# Patient Record
Sex: FEMALE | Race: BLACK OR AFRICAN AMERICAN | NOT HISPANIC OR LATINO | Employment: FULL TIME | URBAN - METROPOLITAN AREA
[De-identification: names, ages, dates, MRNs, and addresses within clinical notes are randomized per-mention and may not be internally consistent; named-entity substitution may affect disease eponyms.]

---

## 2017-10-09 ENCOUNTER — INITIAL CONSULT (OUTPATIENT)
Dept: UROGYNECOLOGY | Facility: CLINIC | Age: 57
End: 2017-10-09
Payer: COMMERCIAL

## 2017-10-09 VITALS
SYSTOLIC BLOOD PRESSURE: 159 MMHG | BODY MASS INDEX: 26.61 KG/M2 | WEIGHT: 155.88 LBS | DIASTOLIC BLOOD PRESSURE: 96 MMHG | HEIGHT: 64 IN | TEMPERATURE: 98 F | HEART RATE: 81 BPM

## 2017-10-09 DIAGNOSIS — N39.8 VOIDING DYSFUNCTION: ICD-10-CM

## 2017-10-09 DIAGNOSIS — R39.89 CYSTALGIA: ICD-10-CM

## 2017-10-09 DIAGNOSIS — N31.8 FREQUENCY-URGENCY SYNDROME: Primary | ICD-10-CM

## 2017-10-09 DIAGNOSIS — R35.1 NOCTURIA: ICD-10-CM

## 2017-10-09 PROCEDURE — 99999 PR PBB SHADOW E&M-NEW PATIENT-LVL III: CPT | Mod: PBBFAC,,, | Performed by: OBSTETRICS & GYNECOLOGY

## 2017-10-09 PROCEDURE — 99204 OFFICE O/P NEW MOD 45 MIN: CPT | Mod: S$GLB,,, | Performed by: OBSTETRICS & GYNECOLOGY

## 2017-10-09 PROCEDURE — 81002 URINALYSIS NONAUTO W/O SCOPE: CPT | Mod: S$GLB,,, | Performed by: OBSTETRICS & GYNECOLOGY

## 2017-10-09 RX ORDER — RAMIPRIL 5 MG/1
5 CAPSULE ORAL DAILY
Refills: 0 | COMMUNITY
Start: 2017-08-08

## 2017-10-09 RX ORDER — TAMSULOSIN HYDROCHLORIDE 0.4 MG/1
0.4 CAPSULE ORAL DAILY
Qty: 30 CAPSULE | Refills: 11 | Status: SHIPPED | OUTPATIENT
Start: 2017-10-09 | End: 2018-10-09

## 2017-10-09 RX ORDER — FLAVOXATE HYDROCHLORIDE 100 MG/1
100 TABLET ORAL 2 TIMES DAILY
Qty: 60 TABLET | Refills: 11 | Status: SHIPPED | OUTPATIENT
Start: 2017-10-09

## 2017-10-09 NOTE — LETTER
October 9, 2017      [facility]  [address], [address]  [facility] [address]  Phone: [phone]  Fax: [phone]       Patient: Michael Rosado   YOB: 1960  Date of Visit: 10/09/2017    To Whom It May Concern:     Mrs Michael Rosado  was at [facility] on 10/09/2017. She may return to work on 10/10/2017 with no restrictions. If you have any questions or concerns, or if I can be of further assistance, please do not hesitate to contact me.    Sincerely,    Dr LAINEY Ivy/ [place]

## 2017-10-09 NOTE — PROGRESS NOTES
Subjective:     Chief Complaint:  urgency and nocturia  History of Present Illness: Michael Rosado is a 56 y.o. female who presents for frequency, urgency and nocturia.  His been going on for a long time in about 3 years ago she was told she might have a UTI and was given antibiotics with no response.  She was later  told she might have overactive bladder and was given Vesicare.  Also did not give her any improvement.  She has nocturia at least 3 times per night and sometimes more but typically reaches the bathroom without leakage.  She gets strong urge during the day but also typically reaches the bathroom, without urge incontinence.  she has postvoid fullness.  She also does not have stress incontinence.  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  She is a menopausal  2.  She notices that sometimes ibuprofen helps relieve her bladder symptoms.  She has no hematuria on urinalysis /has never seen any hematuria.  She denies frequent urinary tract infections and any history of kidney stones.  She is not on any hormone replacement.  All her history indicates a fairly healthy patient    Review of Systems    Constitutional: No acute distress. No weight gain/loss.  Eyes: No vision changes.  ENT: No headaches.   Respiratory: Nonsmoker  Cardiovascular: No chest pain. No edema.   Gastrointestinal: No constipation. No diarrhea. No fecal incontinence.   Genitourinary: No vaginal bleeding or discharge.  Integument/Breast: Negative  Hematologic/Lymphatic: No history of anemia.  Musculoskeletal: No major back pain. No abdominal pain.  Neurological: No known disc problems. No paresthesias.  Behavioral/Psych: No history of depression.   Endocrine: No hormonal replacement.  Allergy/Immune: no recent reactions     Objective:   General Exam:  General appearance: WDNF. NAD.   HEENT: Elo. EOM's intact.  Neck: Normal thyroid.   Back: No CVA tenderness.  RESP: No SOB.  Breasts:  deferred  Abdomen: Benign without localizing signs.  Extremities: No edema. No varices.  Lymphatic: noncontributory  Skin: No rashes. No lesions.  Neurologic: Intact.   Psych: Oriented.   Pelvic Exam:  V:  No lesions. No palpable nodes.   Va:No d/c bleeding or lesions.  Good length and support   .Meatus:No caruncle or stenosis  Urethra: Non tender. No suburethral masses.  No hypermobility  Cx/Cuff: Normal   Uterus:  nontender  Ad: No mass or tenderness.  Levators :Symmetrical.  Increased tone and increased bulk. Non tender.4/5 contractions  BL:  tender to palpation  RV: No hemorrhoids.  Assessment:   Frequency and urgency with significant bladder tenderness.  She did not respond to Vesicare.  Does not appear to be typical overactive bladder.  The pelvic floor tone and contractions are probably from chronic holding efforts.  Cannot rule out early interstitial cystitis     Plan:      Flomax and flavoxate-is not respond we will try myrbetreq, I suspect she may need cystoscopy to further evaluate

## 2017-10-11 LAB
BILIRUB SERPL-MCNC: ABNORMAL MG/DL
BLOOD URINE, POC: ABNORMAL
COLOR, POC UA: YELLOW
GLUCOSE UR QL STRIP: ABNORMAL
KETONES UR QL STRIP: ABNORMAL
LEUKOCYTE ESTERASE URINE, POC: ABNORMAL
NITRITE, POC UA: ABNORMAL
PH, POC UA: 7
PROTEIN, POC: ABNORMAL
SPECIFIC GRAVITY, POC UA: 1
UROBILINOGEN, POC UA: ABNORMAL

## 2017-11-06 ENCOUNTER — TELEPHONE (OUTPATIENT)
Dept: UROGYNECOLOGY | Facility: CLINIC | Age: 57
End: 2017-11-06

## 2017-11-06 NOTE — TELEPHONE ENCOUNTER
----- Message from Camelia Taylor sent at 11/6/2017  9:01 AM CST -----  Contact: Patient  Patient is trying to get in to see the doctor sooner than her appointment on 11/20.  Call Back#[phone] ('s cell, Semaj, patient is a  and in class all day) & [phone] (Patient's cell, please call after 3:30)  Thanks

## 2017-11-06 NOTE — TELEPHONE ENCOUNTER
----- Message from Jaquelin Gonzalez sent at 11/6/2017 12:22 PM CST -----  Contact: self  Transferred call to POD

## 2017-11-06 NOTE — TELEPHONE ENCOUNTER
Spoke with patient who states she no longer feel she needs an sooner appt. States she is feeling much better since this a.m will keep 11/20/17 appt

## 2017-11-06 NOTE — TELEPHONE ENCOUNTER
Spoke with patient  who states she have been having c/o same issues from when she was initally seen. Requested to be seen by Dr Biju powers that 11/20. Offered 11/7/17 appt. Pt  states she is a  and he will send her a text at 12:30 p.m to confirm appt. Awaiting pt call back

## 2017-11-20 ENCOUNTER — OFFICE VISIT (OUTPATIENT)
Dept: UROGYNECOLOGY | Facility: CLINIC | Age: 57
End: 2017-11-20
Payer: COMMERCIAL

## 2017-11-20 VITALS
DIASTOLIC BLOOD PRESSURE: 88 MMHG | HEIGHT: 64 IN | BODY MASS INDEX: 27.28 KG/M2 | WEIGHT: 159.81 LBS | SYSTOLIC BLOOD PRESSURE: 147 MMHG | TEMPERATURE: 98 F | HEART RATE: 73 BPM

## 2017-11-20 DIAGNOSIS — M62.89 PELVIC FLOOR DYSFUNCTION: ICD-10-CM

## 2017-11-20 DIAGNOSIS — R35.0 FREQUENCY OF MICTURITION: ICD-10-CM

## 2017-11-20 DIAGNOSIS — N32.81 OAB (OVERACTIVE BLADDER): Primary | ICD-10-CM

## 2017-11-20 DIAGNOSIS — R39.89 CYSTALGIA: ICD-10-CM

## 2017-11-20 DIAGNOSIS — R35.1 NOCTURIA: ICD-10-CM

## 2017-11-20 PROCEDURE — 99999 PR PBB SHADOW E&M-EST. PATIENT-LVL III: CPT | Mod: PBBFAC,,, | Performed by: OBSTETRICS & GYNECOLOGY

## 2017-11-20 PROCEDURE — 99213 OFFICE O/P EST LOW 20 MIN: CPT | Mod: S$GLB,,, | Performed by: OBSTETRICS & GYNECOLOGY

## 2017-11-20 PROCEDURE — 81002 URINALYSIS NONAUTO W/O SCOPE: CPT | Mod: S$GLB,,, | Performed by: OBSTETRICS & GYNECOLOGY

## 2017-11-20 NOTE — PROGRESS NOTES
"Subjective:     Chief Complaint: urgency  History of Present Illness: Michael Rosado is a 56 y.o. female who presents for 3 yr history of urgency.  She rarely has incontinence.  Has nocturia.  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  She has discontinued carbonated drinks but still drinks 1-2 cups of coffee in the morning.  Asked if weight gain could be a contributing factor.  She was started on Flomax and flavoxate and has noticed  significant but not complete response.  She has sinus problems and has some "dryness" when she agrees and question whether this could be related to the sinuses are the medications.    Review of Systems    Constitutional: No acute distress. No weight gain/loss.  Eyes: No vision changes.  ENT: No headaches.   Respiratory: "dryness" when she breathes  Cardiovascular:Hypertension  Gastrointestinal: No constipation. No diarrhea. No fecal incontinence.   Genitourinary: Menopausal  Integument/Breast: Negative  Hematologic/Lymphatic: No history of anemia.  Musculoskeletal: No back pain. No abdominal pain.  Neurological: No disc problems. No paresthesias.  Behavioral/Psych: No history of depression.   Endocrine: No hormonal replacement.  Allergy/Immune: no recent reactions     Objective:   General Exam:  General appearance: WDNF. NAD.   HEENT: Elo. EOM's intact.  Neck: Normal thyroid.   Back: No CVA tenderness.  RESP: No SOB.  Breasts: deferred  Abdomen: Benign without localizing signs.  Extremities: No edema. No varices.  Lymphatic: noncontributory  Skin: No rashes. No lesions.  Neurologic: Intact.   Psych: Oriented.   Pelvic Exam:  V:  No lesions. No palpable nodes.   Va:No d/c bleeding or lesions.   .Meatus:No caruncle or stenosis  Urethra: Non tender. No suburethral masses.  Cx/Cuff: Normal   Uterus: no abnormality  Ad: No mass or tenderness.  Levators :Symmetrical.  Increased tone and bulk  BL:  tender less than on previous exam.  She describes " previous exam as painful but this one is just more of a mild discomfort  RV: No hemorrhoids.  Assessment:   Improvement in symptoms but still some pelvic floor hypertonicity and bladder tenderness  .Rule out interstitial cystitis at this point  Discussed the role of her coffee intake and what she can do to lessen those effects       Plan:        Continue Flomax and increase flavoxate to 3 times a day then return for potassium permeability test.  If it is positive we should start her on pentosan.  If it is negative the symptoms have not improved further would consider adding myrbetreq  Potassium permeability test is negative and she does not respond to medication she will still need to undergo hydrodistention for better diagnostic evaluation

## 2017-12-06 LAB
BILIRUB SERPL-MCNC: NORMAL MG/DL
BLOOD URINE, POC: NORMAL
COLOR, POC UA: YELLOW
GLUCOSE UR QL STRIP: NORMAL
KETONES UR QL STRIP: NORMAL
LEUKOCYTE ESTERASE URINE, POC: NORMAL
NITRITE, POC UA: NORMAL
PH, POC UA: 7
PROTEIN, POC: NORMAL
SPECIFIC GRAVITY, POC UA: 1.01
UROBILINOGEN, POC UA: NORMAL

## 2017-12-07 ENCOUNTER — OFFICE VISIT (OUTPATIENT)
Dept: UROGYNECOLOGY | Facility: CLINIC | Age: 57
End: 2017-12-07
Payer: COMMERCIAL

## 2017-12-07 VITALS
DIASTOLIC BLOOD PRESSURE: 103 MMHG | RESPIRATION RATE: 18 BRPM | HEART RATE: 94 BPM | SYSTOLIC BLOOD PRESSURE: 160 MMHG | BODY MASS INDEX: 26.72 KG/M2 | WEIGHT: 156.5 LBS | HEIGHT: 64 IN | TEMPERATURE: 97 F

## 2017-12-07 DIAGNOSIS — N30.10 IC (INTERSTITIAL CYSTITIS): Primary | ICD-10-CM

## 2017-12-07 DIAGNOSIS — N32.81 OAB (OVERACTIVE BLADDER): ICD-10-CM

## 2017-12-07 DIAGNOSIS — M62.89 PELVIC FLOOR DYSFUNCTION: ICD-10-CM

## 2017-12-07 LAB
BILIRUB SERPL-MCNC: NORMAL MG/DL
BLOOD URINE, POC: NORMAL
COLOR, POC UA: NORMAL
GLUCOSE UR QL STRIP: NORMAL
KETONES UR QL STRIP: NORMAL
LEUKOCYTE ESTERASE URINE, POC: NORMAL
NITRITE, POC UA: NORMAL
PH, POC UA: 7
PROTEIN, POC: NORMAL
SPECIFIC GRAVITY, POC UA: 1
UROBILINOGEN, POC UA: NORMAL

## 2017-12-07 PROCEDURE — 81002 URINALYSIS NONAUTO W/O SCOPE: CPT | Mod: S$GLB,,, | Performed by: NURSE PRACTITIONER

## 2017-12-07 PROCEDURE — 99214 OFFICE O/P EST MOD 30 MIN: CPT | Mod: 25,S$GLB,, | Performed by: NURSE PRACTITIONER

## 2017-12-07 PROCEDURE — 51700 IRRIGATION OF BLADDER: CPT | Mod: S$GLB,,, | Performed by: NURSE PRACTITIONER

## 2017-12-07 PROCEDURE — 99999 PR PBB SHADOW E&M-EST. PATIENT-LVL IV: CPT | Mod: PBBFAC,,, | Performed by: NURSE PRACTITIONER

## 2017-12-07 RX ORDER — LIDOCAINE HYDROCHLORIDE 20 MG/ML
20 INJECTION, SOLUTION INFILTRATION; PERINEURAL
Status: COMPLETED | OUTPATIENT
Start: 2017-12-07 | End: 2017-12-07

## 2017-12-07 RX ADMIN — LIDOCAINE HYDROCHLORIDE 20 ML: 20 INJECTION, SOLUTION INFILTRATION; PERINEURAL at 03:12

## 2017-12-07 NOTE — PROGRESS NOTES
Subjective:       Patient ID: Michael Rosado is a 57 y.o. female.    Chief Complaint: potassium challenge    HPI  Michael Rosado is a 57 y.o. female who presents today for a potassium challenge test. She has been taking flavoxate and flomax and has not had complete resolution of her symptoms.  She has urgency, nocturia, and feeling of PVF.  She feels that some of her symptoms do seem to worsen with certain foods or drinks, but is not sure this is always the case.  She denies any real change in her symptoms since being seen by Dr. Ivy on 11/20/17.  She is ready to proceed with the test.     Review of Systems   Constitutional: Negative for activity change, fever and unexpected weight change.   HENT: Negative for hearing loss.    Eyes: Negative for visual disturbance.   Respiratory: Negative for shortness of breath and wheezing.    Cardiovascular: Negative for chest pain, palpitations and leg swelling.   Gastrointestinal: Negative for abdominal pain, constipation and diarrhea.   Genitourinary: Positive for frequency and urgency. Negative for dyspareunia, dysuria, vaginal bleeding and vaginal discharge.   Musculoskeletal: Negative for gait problem and neck pain.   Skin: Negative for rash and wound.   Allergic/Immunologic: Negative for immunocompromised state.   Neurological: Negative for tremors, speech difficulty and weakness.   Hematological: Does not bruise/bleed easily.   Psychiatric/Behavioral: Negative for agitation and confusion.       Objective:      Physical Exam   Constitutional: She is oriented to person, place, and time. She appears well-developed and well-nourished. No distress.   HENT:   Head: Normocephalic and atraumatic.   Neck: Neck supple. No thyromegaly present.   Pulmonary/Chest: Effort normal. No respiratory distress.   Abdominal: Soft. There is no tenderness. No hernia.   Musculoskeletal: Normal range of motion.   Neurological: She is alert and oriented to person, place, and time.   Skin:  Skin is warm and dry. No rash noted.   Psychiatric: She has a normal mood and affect. Her behavior is normal.     Pelvic Exam:  V: No lesions. No palpable nodes.   Meatus:No caruncle or stenosis  Urethra: Non tender. No suburethral masses.  BL: mildly tender  RV: No hemorrhoids.      Assessment:       1. IC (interstitial cystitis)    2. OAB (overactive bladder)    3. Pelvic floor dysfunction          Procedure note- After betadine irrigation of the urethra, lidocaine instilled via urojet.   A #14 Arabic red rubber tip catheter was inserted into the bladder.  100 mls of residual urine noted after pt had voided approx 20 minutes prior.  60 mls of sterile water was instilled into the bladder.  This was kept in the bladder for about 1 minute.  The pt stated that she had a sense of pressure with the water in the bladder.  The water was then removed.  40 mls of 40 meq KCL was then instilled into the bladder and allowed to sit for approx. 3 minutes.  Again the pt had a sensation of pressure and increased urgency.  Towards the end of the 3 minute timeframe, the pt started to have a mild burning sensation.  The KCL was then removed.  60 mls of sterile water was again instilled into the bladder for irrigation and then removed.  Finally, 20 mls of 2% lidocaine instilled into bladder.  Pt instructed to hold mixture for at least 1 hour prior to voiding.  Pt verbalized understanding.    Plan:       IC (interstitial cystitis) - while the potassium challenge test was not markedly positive, she did still have some reaction to the KCL.  She was therefore started on the elmiron and I.C. Diet was reviewed with pt.  -     POCT URINE DIPSTICK WITHOUT MICROSCOPE  -     lidocaine HCL 20 mg/ml (2%) injection 20 mL; 20 mLs by Other route one time.  -     pentosan polysulfate (ELMIRON) 100 mg Cap; Take 1 capsule (100 mg total) by mouth 3 (three) times daily.  Dispense: 90 capsule; Refill: 2    OAB (overactive bladder)- we will see how her  symptoms respond to the I.C. Diet and the elmiron before beginning any OAB medication    Pelvic floor dysfunction- monitor    RTC 1 month

## 2018-01-11 ENCOUNTER — OFFICE VISIT (OUTPATIENT)
Dept: UROGYNECOLOGY | Facility: CLINIC | Age: 58
End: 2018-01-11
Payer: COMMERCIAL

## 2018-01-11 VITALS
DIASTOLIC BLOOD PRESSURE: 94 MMHG | TEMPERATURE: 99 F | WEIGHT: 155.88 LBS | SYSTOLIC BLOOD PRESSURE: 158 MMHG | BODY MASS INDEX: 26.61 KG/M2 | HEIGHT: 64 IN | HEART RATE: 81 BPM

## 2018-01-11 DIAGNOSIS — N30.10 IC (INTERSTITIAL CYSTITIS): Primary | ICD-10-CM

## 2018-01-11 DIAGNOSIS — R35.0 URINARY FREQUENCY: ICD-10-CM

## 2018-01-11 DIAGNOSIS — M62.89 PELVIC FLOOR DYSFUNCTION: ICD-10-CM

## 2018-01-11 PROCEDURE — 99999 PR PBB SHADOW E&M-EST. PATIENT-LVL III: CPT | Mod: PBBFAC,,, | Performed by: NURSE PRACTITIONER

## 2018-01-11 PROCEDURE — 99213 OFFICE O/P EST LOW 20 MIN: CPT | Mod: S$GLB,,, | Performed by: NURSE PRACTITIONER

## 2018-01-11 NOTE — PROGRESS NOTES
Subjective:       Patient ID: Michael Rosado is a 57 y.o. female.    Chief Complaint: Follow-up I.C.    HPI  Michael Rosado is a 57 y.o. female who presents today for follow up on her I.C.  She has been taking the Elmiron and feels that she is doing great.  She no longer has any bladder tenderness or really any sense of urgency.    She has some frequency but feels this has improved as well.  She urinates about every 2-3 hours during the day and has nocturia x 1-2 on occasion.  She denies any incontinence, any feeling of PVF or any irritation.  She has been watching her diet closely and avoiding irritants.  She is very happy with her status.  She denies any vaginal complaints.    Review of Systems   Constitutional: Negative for activity change, fever and unexpected weight change.   HENT: Negative for hearing loss.    Eyes: Negative for visual disturbance.   Respiratory: Negative for shortness of breath and wheezing.    Cardiovascular: Negative for chest pain, palpitations and leg swelling.   Gastrointestinal: Negative for abdominal pain, constipation and diarrhea.   Genitourinary: Positive for frequency. Negative for dyspareunia, dysuria, urgency, vaginal bleeding and vaginal discharge.   Musculoskeletal: Negative for gait problem and neck pain.   Skin: Negative for rash and wound.   Allergic/Immunologic: Negative for immunocompromised state.   Neurological: Negative for tremors, speech difficulty and weakness.   Hematological: Does not bruise/bleed easily.   Psychiatric/Behavioral: Negative for agitation and confusion.       Objective:      Physical Exam   Constitutional: She is oriented to person, place, and time. She appears well-developed and well-nourished. No distress.   HENT:   Head: Normocephalic and atraumatic.   Neck: Neck supple. No thyromegaly present.   Pulmonary/Chest: Effort normal. No respiratory distress.   Abdominal: Soft. There is no tenderness. No hernia.   Musculoskeletal: Normal range of  motion.   Neurological: She is alert and oriented to person, place, and time.   Skin: Skin is warm and dry. No rash noted.   Psychiatric: She has a normal mood and affect. Her behavior is normal.       Assessment:       1. IC (interstitial cystitis)    2. Urinary frequency    3. Pelvic floor dysfunction        Plan:       IC (interstitial cystitis)- continue with elmiron at this time.  She can stop the flomax and flavoxate at this point, but should hold on to the medication in case it needs to be resumed.    Urinary frequency- much improved,  Continue to monitor  -     POCT urine dipstick without microscope    Pelvic floor dysfunction- improved, monitor    RTC 3 months

## 2018-03-13 DIAGNOSIS — N30.10 IC (INTERSTITIAL CYSTITIS): ICD-10-CM

## 2018-03-13 RX ORDER — PENTOSAN POLYSULFATE SODIUM 100 MG/1
CAPSULE, GELATIN COATED ORAL
Qty: 90 CAPSULE | Refills: 2 | Status: SHIPPED | OUTPATIENT
Start: 2018-03-13 | End: 2018-05-10 | Stop reason: SDUPTHER

## 2018-04-11 ENCOUNTER — OFFICE VISIT (OUTPATIENT)
Dept: UROGYNECOLOGY | Facility: CLINIC | Age: 58
End: 2018-04-11
Payer: COMMERCIAL

## 2018-04-11 VITALS
WEIGHT: 155.19 LBS | DIASTOLIC BLOOD PRESSURE: 94 MMHG | SYSTOLIC BLOOD PRESSURE: 159 MMHG | HEART RATE: 74 BPM | BODY MASS INDEX: 26.49 KG/M2 | HEIGHT: 64 IN

## 2018-04-11 DIAGNOSIS — M62.89 PELVIC FLOOR DYSFUNCTION: ICD-10-CM

## 2018-04-11 DIAGNOSIS — R35.0 URINARY FREQUENCY: ICD-10-CM

## 2018-04-11 DIAGNOSIS — N30.10 IC (INTERSTITIAL CYSTITIS): Primary | ICD-10-CM

## 2018-04-11 LAB
BILIRUB SERPL-MCNC: ABNORMAL MG/DL
BLOOD URINE, POC: ABNORMAL
COLOR, POC UA: YELLOW
GLUCOSE UR QL STRIP: ABNORMAL
KETONES UR QL STRIP: ABNORMAL
LEUKOCYTE ESTERASE URINE, POC: ABNORMAL
NITRITE, POC UA: ABNORMAL
PH, POC UA: 6
PROTEIN, POC: ABNORMAL
SPECIFIC GRAVITY, POC UA: 1
UROBILINOGEN, POC UA: ABNORMAL

## 2018-04-11 PROCEDURE — 81002 URINALYSIS NONAUTO W/O SCOPE: CPT | Mod: S$GLB,,, | Performed by: NURSE PRACTITIONER

## 2018-04-11 PROCEDURE — 99999 PR PBB SHADOW E&M-EST. PATIENT-LVL III: CPT | Mod: PBBFAC,,, | Performed by: NURSE PRACTITIONER

## 2018-04-11 PROCEDURE — 99213 OFFICE O/P EST LOW 20 MIN: CPT | Mod: 25,S$GLB,, | Performed by: NURSE PRACTITIONER

## 2018-04-11 NOTE — PROGRESS NOTES
Subjective:       Patient ID: Michael Rosado is a 57 y.o. female.    Chief Complaint: Follow-up I.C.    HPI  Michael Rosado is a 57 y.o. female who presents today for follow up regarding her I.C.  She has been taking the elmiron TID and feels that it is working very well.  She had a flare about 2 months ago, she drank aloe vera juice and she has not had any pain since.  She has been watching her diet closely.  She has frequency now x 1-2 during the day and nocturia x 1-2 on occasion.  She denies any incontinence.  She denies any PVF.  She denies any pain.  She denies any vaginal complaints/concerns and is very happy with her status.    Review of Systems   Constitutional: Negative for activity change, fever and unexpected weight change.   HENT: Negative for hearing loss.    Eyes: Negative for visual disturbance.   Respiratory: Negative for shortness of breath and wheezing.    Cardiovascular: Negative for chest pain, palpitations and leg swelling.   Gastrointestinal: Negative for abdominal pain, constipation and diarrhea.   Genitourinary: Negative for dyspareunia, dysuria, frequency, urgency, vaginal bleeding and vaginal discharge.   Musculoskeletal: Negative for gait problem and neck pain.   Skin: Negative for rash and wound.   Allergic/Immunologic: Negative for immunocompromised state.   Neurological: Negative for tremors, speech difficulty and weakness.   Hematological: Does not bruise/bleed easily.   Psychiatric/Behavioral: Negative for agitation and confusion.       Objective:      Physical Exam   Constitutional: She is oriented to person, place, and time. She appears well-developed and well-nourished. No distress.   HENT:   Head: Normocephalic and atraumatic.   Neck: Neck supple. No thyromegaly present.   Pulmonary/Chest: Effort normal. No respiratory distress.   Abdominal: Soft. There is no tenderness. No hernia.   Musculoskeletal: Normal range of motion.   Neurological: She is alert and oriented to person,  place, and time.   Skin: Skin is warm and dry. No rash noted.   Psychiatric: She has a normal mood and affect. Her behavior is normal.     Pelvic Exam:  deferred      Assessment:       1. IC (interstitial cystitis)    2. Urinary frequency    3. Pelvic floor dysfunction        Plan:       IC (interstitial cystitis)- continue with the elmiron.  Discussed titrating it down very gradually.  She can try BID for 3 months and see how she does.  If she feels that the irritation is coming back she may have to take it TID.  If she does well with the BID dosing, she can try daily.    Urinary frequency- improved monitor    Pelvic floor dysfunction- monitor    RTC 6 months

## 2018-05-10 DIAGNOSIS — N30.10 IC (INTERSTITIAL CYSTITIS): ICD-10-CM

## 2018-10-18 ENCOUNTER — OFFICE VISIT (OUTPATIENT)
Dept: UROGYNECOLOGY | Facility: CLINIC | Age: 58
End: 2018-10-18
Payer: COMMERCIAL

## 2018-10-18 VITALS
BODY MASS INDEX: 25.85 KG/M2 | HEIGHT: 64 IN | TEMPERATURE: 98 F | HEART RATE: 70 BPM | WEIGHT: 151.44 LBS | DIASTOLIC BLOOD PRESSURE: 92 MMHG | SYSTOLIC BLOOD PRESSURE: 156 MMHG

## 2018-10-18 DIAGNOSIS — M62.89 PELVIC FLOOR DYSFUNCTION: ICD-10-CM

## 2018-10-18 DIAGNOSIS — N30.10 IC (INTERSTITIAL CYSTITIS): ICD-10-CM

## 2018-10-18 DIAGNOSIS — R35.0 URINARY FREQUENCY: Primary | ICD-10-CM

## 2018-10-18 LAB
BILIRUB SERPL-MCNC: NORMAL MG/DL
BLOOD URINE, POC: NORMAL
COLOR, POC UA: NORMAL
GLUCOSE UR QL STRIP: NORMAL
KETONES UR QL STRIP: NORMAL
LEUKOCYTE ESTERASE URINE, POC: NORMAL
NITRITE, POC UA: NORMAL
PH, POC UA: 6
PROTEIN, POC: NORMAL
SPECIFIC GRAVITY, POC UA: 1
UROBILINOGEN, POC UA: NORMAL

## 2018-10-18 PROCEDURE — 99999 PR PBB SHADOW E&M-EST. PATIENT-LVL III: CPT | Mod: PBBFAC,,, | Performed by: NURSE PRACTITIONER

## 2018-10-18 PROCEDURE — 3008F BODY MASS INDEX DOCD: CPT | Mod: CPTII,S$GLB,, | Performed by: NURSE PRACTITIONER

## 2018-10-18 PROCEDURE — 99213 OFFICE O/P EST LOW 20 MIN: CPT | Mod: 25,S$GLB,, | Performed by: NURSE PRACTITIONER

## 2018-10-18 PROCEDURE — 81002 URINALYSIS NONAUTO W/O SCOPE: CPT | Mod: S$GLB,,, | Performed by: NURSE PRACTITIONER

## 2018-10-18 RX ORDER — RAMIPRIL 5 MG/1
5 CAPSULE ORAL
COMMUNITY
Start: 2018-07-17 | End: 2019-07-17

## 2018-10-18 RX ORDER — MONTELUKAST SODIUM 10 MG/1
10 TABLET ORAL
COMMUNITY
Start: 2018-06-19 | End: 2019-06-19

## 2018-10-18 RX ORDER — PENTOSAN POLYSULFATE SODIUM 100 MG/1
100 CAPSULE, GELATIN COATED ORAL 3 TIMES DAILY
Refills: 3 | COMMUNITY
Start: 2018-10-08

## 2018-10-18 RX ORDER — MONTELUKAST SODIUM 10 MG/1
10 TABLET ORAL NIGHTLY
Refills: 2 | COMMUNITY
Start: 2018-07-19

## 2018-10-18 NOTE — PROGRESS NOTES
Subjective:       Patient ID: Michael Rosado is a 57 y.o. female.    Chief Complaint: I.C.    HPI  Michael Rosado is a 57 y.o. female who presents today for follow up in regards to her I.C.  She has been taking the elmiron BID for the past few months and feels that low levels of discomfort have returned.  She has frequency x 10 during the day and nocturia x 3.  She denies any incontinence.  She has some feeling of PVF.  She denies any vaginal complaints.  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  She denies any other acute complaints/concerns at this time.    Review of Systems   Constitutional: Negative for activity change, fever and unexpected weight change.   HENT: Negative for hearing loss.    Eyes: Negative for visual disturbance.   Respiratory: Negative for shortness of breath and wheezing.    Cardiovascular: Negative for chest pain, palpitations and leg swelling.   Gastrointestinal: Negative for abdominal pain, constipation and diarrhea.   Genitourinary: Positive for frequency and urgency. Negative for dyspareunia, dysuria, vaginal bleeding and vaginal discharge.        Mild discomfort   Musculoskeletal: Negative for gait problem and neck pain.   Skin: Negative for rash and wound.   Allergic/Immunologic: Negative for immunocompromised state.   Neurological: Negative for tremors, speech difficulty and weakness.   Hematological: Does not bruise/bleed easily.   Psychiatric/Behavioral: Negative for agitation and confusion.       Objective:      Physical Exam   Constitutional: She is oriented to person, place, and time. She appears well-developed and well-nourished. No distress.   HENT:   Head: Normocephalic and atraumatic.   Neck: Neck supple. No thyromegaly present.   Pulmonary/Chest: Effort normal. No respiratory distress.   Abdominal: Soft. There is tenderness in the suprapubic area. No hernia.   Mild suprapubic  tenderness   Musculoskeletal: Normal range of motion.   Neurological: She is alert and oriented to person, place, and time.   Skin: Skin is warm and dry. No rash noted.   Psychiatric: She has a normal mood and affect. Her behavior is normal.     Pelvic Exam:  deferred      Assessment:       1. Urinary frequency    2. IC (interstitial cystitis)    3. Pelvic floor dysfunction        Plan:       Urinary frequency- monitor  -     POCT urine dipstick without microscope    IC (interstitial cystitis)- continue to watch diet.  Increase the elmiron to TID dosing.  We will schedule cysto with hydro at her next visit.  We will plan on this being done in the June timeframe.  Pt was offered an instillation today to see if this would help with her discomfort and she declined.    Pelvic floor dysfunction - monitor    RTC 3 months

## 2018-10-30 ENCOUNTER — TELEPHONE (OUTPATIENT)
Dept: UROLOGY | Facility: CLINIC | Age: 58
End: 2018-10-30

## 2018-10-30 NOTE — TELEPHONE ENCOUNTER
----- Message from Nicola Kuhn MD sent at 10/26/2018  4:21 PM CDT -----  Regarding: call pt to cancel appt  Scheduled as NP for bladder problems on 11/19.  Patient just established with urogyn NP Kevin on 10/18/18 for management of her intersititial cystitis.   She therefore has established care for her bladder problems and appointment with me should be cancelled and she should keep urogyn f/u as outlined.

## 2018-10-30 NOTE — TELEPHONE ENCOUNTER
Patient's  advised that appt will be canceled.  Advised to have patient call back to discuss recommendations.

## 2025-08-04 ENCOUNTER — TELEPHONE (OUTPATIENT)
Dept: UROGYNECOLOGY | Facility: CLINIC | Age: 65
End: 2025-08-04
Payer: COMMERCIAL

## 2025-08-04 NOTE — TELEPHONE ENCOUNTER
answers call. He states pt is unavailable. He requests to have Nurse call tomorrow at 8-830 am. Will follow.

## 2025-08-05 ENCOUNTER — TELEPHONE (OUTPATIENT)
Dept: UROGYNECOLOGY | Facility: CLINIC | Age: 65
End: 2025-08-05
Payer: COMMERCIAL

## 2025-08-05 NOTE — TELEPHONE ENCOUNTER
Spoke to pt regarding referral for IC from Dr. Saini. Pt is aware that this diagnosis with a referral is appropriate for one of our advanced practitioners. Pt is scheduled with BLUE Jones NP. Pt verbalizes understanding.